# Patient Record
Sex: MALE | Employment: FULL TIME | URBAN - METROPOLITAN AREA
[De-identification: names, ages, dates, MRNs, and addresses within clinical notes are randomized per-mention and may not be internally consistent; named-entity substitution may affect disease eponyms.]

---

## 2019-05-02 ENCOUNTER — APPOINTMENT (OUTPATIENT)
Dept: GENERAL RADIOLOGY | Facility: CLINIC | Age: 39
End: 2019-05-02
Attending: EMERGENCY MEDICINE
Payer: COMMERCIAL

## 2019-05-02 ENCOUNTER — HOSPITAL ENCOUNTER (EMERGENCY)
Facility: CLINIC | Age: 39
Discharge: HOME | End: 2019-05-02
Attending: EMERGENCY MEDICINE | Admitting: EMERGENCY MEDICINE
Payer: COMMERCIAL

## 2019-05-02 VITALS
DIASTOLIC BLOOD PRESSURE: 86 MMHG | WEIGHT: 270 LBS | OXYGEN SATURATION: 99 % | HEIGHT: 76 IN | BODY MASS INDEX: 32.88 KG/M2 | RESPIRATION RATE: 16 BRPM | TEMPERATURE: 98.2 F | SYSTOLIC BLOOD PRESSURE: 125 MMHG | HEART RATE: 81 BPM

## 2019-05-02 DIAGNOSIS — S43.005A SHOULDER DISLOCATION, LEFT, INITIAL ENCOUNTER: ICD-10-CM

## 2019-05-02 PROCEDURE — 99285 EMERGENCY DEPT VISIT HI MDM: CPT | Mod: 25

## 2019-05-02 PROCEDURE — 73030 X-RAY EXAM OF SHOULDER: CPT | Mod: LT

## 2019-05-02 PROCEDURE — 96372 THER/PROPH/DIAG INJ SC/IM: CPT

## 2019-05-02 PROCEDURE — 40000986 XR SHOULDER 2 VIEW LEFT

## 2019-05-02 PROCEDURE — 23650 CLTX SHO DSLC W/MNPJ WO ANES: CPT | Mod: LT

## 2019-05-02 PROCEDURE — 25000128 H RX IP 250 OP 636: Performed by: EMERGENCY MEDICINE

## 2019-05-02 RX ORDER — HYDROCODONE BITARTRATE AND ACETAMINOPHEN 5; 325 MG/1; MG/1
1 TABLET ORAL EVERY 6 HOURS PRN
Qty: 10 TABLET | Refills: 0 | Status: SHIPPED | OUTPATIENT
Start: 2019-05-02 | End: 2019-05-05

## 2019-05-02 RX ADMIN — HYDROMORPHONE HYDROCHLORIDE 1 MG: 1 INJECTION, SOLUTION INTRAMUSCULAR; INTRAVENOUS; SUBCUTANEOUS at 22:22

## 2019-05-02 ASSESSMENT — ENCOUNTER SYMPTOMS
NUMBNESS: 0
SHORTNESS OF BREATH: 0
BACK PAIN: 0

## 2019-05-02 ASSESSMENT — MIFFLIN-ST. JEOR: SCORE: 2256.21

## 2019-05-02 NOTE — ED AVS SNAPSHOT
Emergency Department  [address] [place] [id number]  Phone:  [phone]  Fax:  [phone]                                    Eugene Tucker   MRN: [id number]    Department:   Emergency Department   Date of Visit:  5/2/2019           After Visit Summary Signature Page    I have received my discharge instructions, and my questions have been answered. I have discussed any challenges I see with this plan with the nurse or doctor.    ..........................................................................................................................................  Patient/Patient Representative Signature      ..........................................................................................................................................  Patient Representative Print Name and Relationship to Patient    ..................................................               ................................................  Date                                   Time    ..........................................................................................................................................  Reviewed by Signature/Title    ...................................................              ..............................................  Date                                               Time          22EPIC Rev 08/18

## 2019-05-03 NOTE — DISCHARGE INSTRUCTIONS

## 2019-05-03 NOTE — ED PROVIDER NOTES
"  History     Chief Complaint:  Shoulder Pain    HPI   Eugene Tucker is a 38 year old male with a history of left shoulder dislocation who presents to the emergency department today for evaluation of left shoulder pain. The patient reports that approximately 45 minutes prior to arrival he was walking down stairs when he missed the last step. He explains that as he did so he placed his left arm against the wall in an attempt to catch himself, resulting in pain to the left shoulder.     Allergies:  No Known Drug Allergies    Medications:    Medications reviewed. No current medications.     Past Medical History:    Left shoulder dislocation    Past Surgical History:    Surgical history reviewed. No pertinent surgical history.    Family History:    Family history reviewed. No pertinent family history.     Social History:  The patient presented alone.  Patient Ubered to the ED.  Marital Status:  Single      Review of Systems   Respiratory: Negative for shortness of breath.    Cardiovascular: Negative for chest pain.   Musculoskeletal: Negative for back pain.        Left shoulder pain   Neurological: Negative for numbness.       Physical Exam     Patient Vitals for the past 24 hrs:   BP Temp Temp src Pulse Resp SpO2 Height Weight   05/02/19 2215 125/86 98.2  F (36.8  C) Oral 81 16 99 % 1.93 m (6' 4\") 122.5 kg (270 lb)     Physical Exam  General: Alert, interactive in moderate distress  Head:  Scalp is atraumatic  Eyes:  The pupils are equal, round, and reactive to light    EOM's intact    No scleral icterus  ENT:      Nose:  The external nose is normal  Ears:  External ears are normal  Mouth/Throat: The oropharynx is normal    Mucus membranes are moist       Neck:  Normal range of motion.      There is no rigidity.    Trachea is in the midline         CV:  Well perfused.    Brisk capillary refill in injured arm  Resp:  No respiratory distress      GI:  Nondistended.      MS:  Deformity with hollowness of glenoid of " left shoulder, limited ROM left shoulder  Skin:  Warm and dry, No rash or lesions noted.  Neuro:  Sensation intact in injured arm.  Psych:  Awake. Alert.  Normal affect.      Appropriate interactions.    Emergency Department Course     Imaging:  Radiology findings were communicated with the patient who voiced understanding of the findings.    XR Shoulder Left   There is an acute anterioinferior glenohumeral joint dislocation. No evidence of fracture.   Reading per radiology     XR Shoulder Left 2 Views  Left shoulder has been anatomically reduced. No evidence of fracture.  DOMINIK SANTOS MD  Reading per radiology    Procedures:    Reduction of glenohumeral joint dislocation (shoulder dislocation)  Physician: Robbie Wilkinson  Diagnosis: Left shoulder dislocation  Consent: Informed verbal.   Description of Procedure: Consent as above.  Distal traction applied with the patient in prone position. Scapula rotated in a counterclockwise position until a clear clunk of reduction was appreciated. The neurovascular exam was normal before and after reduction attempt.  The patient tolerated the procedure well, there were no complications.      Interventions:  2222 Dilaudid 1 mg IM    Emergency Department Course:    2157 Nursing notes and vitals reviewed.    2159 The patient was sent for a left shoulder xray while in the emergency department, results above.     2212 I performed an exam of the patient as documented above.     2220 I performed the reduction procedure as documented above.    2238 The patient was sent for a left shoulder xray while in the emergency department, results above.     2315 I personally reviewed the imaging results with the patient and answered all related questions prior to discharge.    Impression & Plan      Medical Decision Making:  Eugene Tucker is a 38 year old male who presents to the emergency department today for evaluation of left shoulder pain, the patient has had dislocations in the  past and states this feels consistent.  After presentation radiograph was obtained demonstrating shoulder dislocation, subsequently the patient received Dilaudid IM and was placed in the prone position and scapular manipulation was utilized to reduce the shoulder without incident.  The patient was neurovascularly intact before and after reduction.  There were no complications and the patient was discharged home, he will follow-up with [facility] orthopedics and return if new symptoms develop.    Diagnosis:    ICD-10-CM    1. Shoulder dislocation, left, initial encounter S43.005A      Disposition:   The patient is discharged to home.    Discharge Medications:     Review of your medicines      START taking      Dose / Directions   HYDROcodone-acetaminophen 5-325 MG tablet  Commonly known as:  NORCO      Dose:  1 tablet  Take 1 tablet by mouth every 6 hours as needed for severe pain  Quantity:  10 tablet  Refills:  0           Where to get your medicines      Some of these will need a paper prescription and others can be bought over the counter. Ask your nurse if you have questions.    Bring a paper prescription for each of these medications    HYDROcodone-acetaminophen 5-325 MG tablet       Scribe Disclosure:  I, Mckenzie Cramer, am serving as a scribe at 10:03 PM on 5/2/2019 to document services personally performed by Robbie Wilkinson MD based on my observations and the provider's statements to me.     EMERGENCY DEPARTMENT       Robbie Wilkinson MD  05/02/19 5927